# Patient Record
Sex: MALE | Race: WHITE | Employment: FULL TIME | URBAN - METROPOLITAN AREA
[De-identification: names, ages, dates, MRNs, and addresses within clinical notes are randomized per-mention and may not be internally consistent; named-entity substitution may affect disease eponyms.]

---

## 2019-05-15 ENCOUNTER — HOSPITAL ENCOUNTER (OUTPATIENT)
Dept: PHYSICAL THERAPY | Age: 54
Discharge: HOME | End: 2019-05-15
Payer: COMMERCIAL

## 2019-05-15 PROCEDURE — 97162 PT EVAL MOD COMPLEX 30 MIN: CPT

## 2019-05-15 PROCEDURE — 97140 MANUAL THERAPY 1/> REGIONS: CPT

## 2019-05-15 NOTE — PROGRESS NOTES
[address] PHYS[facility] THERAPY AT [facility] [address]  Phone [phone]  Fax [phone] PLAN OF CARE / STATEMENT OF MEDICAL NECESSITY FOR PHYSICAL THERAPY SERVICES Patient Name: Brandon Karimi : 1965 Medical  
Diagnosis: Left foot pain [M79.672] Treatment Diagnosis: Left foot pain Onset Date: Chronic Referral Source: Leighann Robison MD [place] [facility]): 5/15/2019 Prior Hospitalization: See medical history Provider #: [id number] Prior Level of Function: Functionally I Comorbidities: Hx of Left ankle scope, Right knee scope, prior episode of plantar fascitis Medications: Verified on Patient Summary List  
The Plan of Care and following information is based on the information from the initial evaluation.  
================================================================================= Assessment / key information:  47year old male presents for PT evaluation with diagnosis of Left foot pain. Patient referred by podiatry. Patient has undergone the following treatments for Left foot pain prior to presenting for PT evaluation: a prior episode of PT > 5 years ago which resulted in decreased pain and increased function, DPM issued B foot inserts with arch support and heel cup, plantar fascitis boot that he wears for an hour a day 4 or more days a week, 2 cortisone injections into the Left heel/PF (2018 and 2019) with the first decreasing pain for a month and the second not changing symptoms. Patient points to the medial left heel and the plantar fascia as the location of the pain. Patient reports that the pain can range from a 2-8/10. He reports increased heel pain on days that he works out at [employer]. He states that there is no pain while working out, but after he can experience severe pain when weight bearing after sitting for a prolonged time.  Patient has been treating the symptoms with ice and ibuprofen for pain management. 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 FOTO= 51/100. Functional limitations include decreased ability to ambulate or stand for prolonged time, and decreased ability to perform regular work out regimen. Clinical exam reveals decreased ROM of the Left foot when compared to the Right:   Left Foot Dorsiflexion : 0, Plantarflexion 30 *, Eversion 10 *, Inversion 40 *; Right Foot Dorsiflexion : 8*, Plantarflexion 30* , Eversion 15 *, Inversion 40*. Left Foot strength: Plantar flexion 4/5 with ability to perform 11 heel raises in standing. After 11 HR patient fatigues with decreased AROM. Right PF strength 5/5. All other planes of motion of B ankle 5/5. Patient ambulates with slight antalgic gait on the Left. In weight bearing, patient presents with a neutral stance. Palpation reveals extreme TTP along Left medial heel and mid plantar fascia. Trigger points are present in Left Soleus and Gastroc along with plantar fascia and of note is increased thickness of Left Achilles tendon. . Patient responded well to KinesioTape applied to the Left calf, achilles tendon and plantar fascia to decrease tension through PF. Patient should benefit from an episode of skilled PT to address above functional limitations and clinical deficits to improve quality of life and return to PLOF. Patient education performed with Gastroc/soleus stretch issued for HEP and use of frozen water bottle to perform self massage along the Left foot.  
================================================================================= Eval Complexity: History: HIGH Complexity :3+ comorbidities / personal factors will impact the outcome/ POC Exam:HIGH Complexity : 4+ Standardized tests and measures addressing body structure, function, activity limitation and / or participation in recreation  Presentation: MEDIUM Complexity : Evolving with changing characteristics  Clinical Decision Making:MEDIUM Complexity : FOTO score of 26-74Overall Complexity:MEDIUM Problem List: pain affecting function, decrease ROM, decrease strength, impaired gait/ balance, decrease ADL/ functional abilitiies, decrease activity tolerance and decrease flexibility/ joint mobility Treatment Plan may include any combination of the following: Therapeutic exercise, Therapeutic activities, Neuromuscular re-education, Physical agent/modality, Gait/balance training, Manual therapy, Patient education, Self Care training, Functional mobility training and Home safety training Patient / Family readiness to learn indicated by: asking questions and interest 
Persons(s) to be included in education: patient (P) Barriers to Learning/Limitations: None Measures taken:   
Patient Goal (s): \"To break up my tendonitis\" Patient self reported health status: excellent Rehabilitation Potential: good ? Short Term Goals: To be accomplished in  3  treatments: 1. Patient will demonstrate I and compliance with HEP to indicate active role in the rehab process. 2. Patient will report a 50% decrease in symptoms to allow for improved quality of life. ? Long Term Goals: To be accomplished in  8-12  treatments: 1. Patient will increase Left foot dorsiflexion to 10* to allow for improved gait mechanics. 2. Patient will demonstrate 20 Heel Raises with full ROM without reports of increased pain. 3. Patient will report ability to ambulate 500 ft without increased pain to allow for return to prior level of function. 4. Patient will report 0/10 pain in the Left foot/heel during and after activity to allow for return to PLOF. 5. Patient will increase FOTO score from 51 to 67/100 to indicate improved I with functional mobility. Frequency / Duration:   Patient to be seen  2-3  times per week for 8-12  treatments: 
Patient / Caregiver education and instruction: self care and brace/ splint application G-Codes (GP): NA 
 Therapist Signature: Bruna Mo PT Date: 5/15/2019 Certification Period: NA Time: 7:21 AM  
=========================================================================================== I certify that the above Physical Therapy Services are being furnished while the patient is under my care. I agree with the treatment plan and certify that this therapy is necessary. Physician Signature:       Date:      Time:  Please sign and return to [facility] or you may fax the signed copy to [phone]. Thank you.

## 2019-05-19 NOTE — PROGRESS NOTES
Request for use of Dry Needling/Intramuscular Manual Therapy Patient: Amanda Fuller     Referral Source: Renee Lewis MD 
Diagnosis: Left foot pain [M79.672]      : 1965 Date of initial visit: 5/15/2019   Attended visits: 1  Missed Visits: 0 Based on findings from the physical therapy examination and evaluation, the evaluating therapist believes the patient, Amanda Fuller  would benefit from including Dry Needling as part of the plan of care. Dry needling is a treatment technique utilized in conjunction with other PT interventions to inactivate myofascial trigger points and the pain and dysfunction they cause. Dry Needling is an advanced procedure that requires additional training including greater than 54 hours of intensive course work. Physical Therapists at [address] [place] [address] are certified through [address] [place] [address] courses for their education and are certified to perform treatments. PROCEDURE: 
? Solid filament sterile needle (typically 0.3mm/30 gauge) inserted into a trigger point ? Repeated movements inactivate the trigger points, taking 30-60 seconds per site ? Typically consists of 1 dry needling session per week and a possible second treatment including muscle re-education, flexibility, strengthening and other manual techniques to facilitate the benefits of dry needling BENEFITS: 
? Inactivation of trigger points ? Decreased pain ? Increased muscle length ? Improved movement patterns ? Restoration of function POTENTIAL RISKS: 
? Post-needling soreness ? Infection ? Bruising/bleeding ? Penetration of a nerve ? Pneumothorax All treating PTs have been thoroughly educated in avoiding adverse reactions If you agree with this recommendation, please sign this form and fax it to us at [phone]. If you have questions or concerns regarding dry needling or any other treatment we may be providing, please contact us at [phone]. Thank you for allowing us to assist in the care of your patient. Sukhi Rosa, PT    5/19/2019 2:55 PM  
NOTE TO PHYSICIAN:  PLEASE COMPLETE THE ORDERS BELOW AND  
FAX TO [facility]: [phone] If you are unable to process this request in 24 hours please contact our office:  
[phone] ? I have read the above request and AGREE to the recommendation of including dry needling as part of the plan of care. ? I have read the above request and DO NOT AGREE to including dry needling as part of the plan of care. ? I have read the above report and request that my patient continue therapy with the following changes/special instructions: 
________________________________________________________________________ Physicians signature: _______________________________________________ Date: ______________Time:_______________

## 2019-05-19 NOTE — PROGRESS NOTES
PT DAILY TREATMENT NOTE/FOOT AND ANKLE EVAL 10-18 Patient Name: Tawny Meckel Date:5/15/2019 : 1965 [x]  Patient  Verified Payor: BLUE CROSS / Plan: [facility] PPO / Product Type: PPO / In time:140  Out time:225 Total Treatment Time (min): 45 Visit #: 1 of  Medicare/[facility] Only Total Timed Codes (min):  10 1:1 Treatment Time:  45  
 
Treatment Area: Left foot pain [M79.672] SUBJECTIVE Pain Level (0-10 scale): 3/10 []constant [x]intermittent []improving []worsening []no change since onset Any medication changes, allergies to medications, adverse drug reactions, diagnosis change, or new procedure performed?: [x] No    [] Yes (see summary sheet for update) Subjective functional status/changes:  
47year old male presents for PT evaluation with diagnosis of Left foot pain. Patient referred by podiatry. Patient has undergone the following treatments for Left foot pain prior to presenting for PT evaluation: a prior episode of PT > 5 years ago which resulted in decreased pain and increased function, DPM issued B foot inserts with arch support and heel cup, plantar fascitis boot that he wears for an hour a day 4 or more days a week, 2 cortisone injections into the Left heel/PF (2018 and 2019) with the first decreasing pain for a month and the second not changing symptoms. Patient points to the medial left heel and the plantar fascia as the location of the pain. Patient reports that the pain can range from a 2-8/10. He reports increased heel pain on days that he works out at [employer]. He states that there is no pain while working out, but after he can experience severe pain when weight bearing after sitting for a prolonged time. Patient has been treating the symptoms with ice and ibuprofen for pain management. 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 FOTO= 51/100.   
 
OBJECTIVE/EXAMINATION 
 
35 min [x]Eval                  []Re-Eval  
 
 10 min Manual Therapy:  Ktape to Left gastroc, AT and PF to decrease force placed through PF insertion Rationale: decrease pain, increase ROM, increase tissue extensibility and decrease trigger points to tolerate prolonged walking and standing With 
 [] TE 
 [] TA 
 [] neuro 
 [] other: Patient Education: [x] Review HEP [] Progressed/Changed HEP based on:  
[] positioning   [] body mechanics   [] transfers   [] heat/ice application   
[] other:   
 
 
 
Physical Therapy Evaluation  - Foot and Ankle Functional limitations include decreased ability to ambulate or stand for prolonged time, and decreased ability to perform regular work out regimen. Clinical exam: 
ROM: Left Foot Dorsiflexion : 0, Plantarflexion 30 *, Eversion 10 *, Inversion 40 *; Right Foot Dorsiflexion : 8*, Plantarflexion 30* , Eversion 15 *, Inversion 40*. Strength: Left Foot strength: Plantar flexion 4/5 with ability to perform 11 heel raises in standing. After 11 HR patient fatigues with decreased AROM. Right PF strength 5/5. All other planes of motion of B ankle 5/5. Functional mobility: Patient ambulates with slight antalgic gait on the Left. In weight bearing, patient presents with a neutral stance. Palpation reveals extreme TTP along Left medial heel and mid plantar fascia. Trigger points are present in Left Soleus and Gastroc along with plantar fascia and of note is increased thickness of Left Achilles tendon. Pain Level (0-10 scale) post treatment: 5/10 ASSESSMENT/Changes in Function:  Patient should benefit from an episode of skilled PT to address above functional limitations and clinical deficits to improve quality of life and return to PLOF. Patient education performed with Gastroc/soleus stretch issued for HEP and use of frozen water bottle to perform self massage along the Left foot.   Patient responded well to KinesioTape applied to the Left calf, achilles tendon and plantar fascia to decrease tension through PF. [x]  See Plan of Care 
[]  See progress note/recertification 
[]  See Discharge Summary Progress towards goals / Updated goals: · Short Term Goals: To be accomplished in  3  treatments: 1. Patient will demonstrate I and compliance with HEP to indicate active role in the rehab process. 2. Patient will report a 50% decrease in symptoms to allow for improved quality of life. · Long Term Goals: To be accomplished in  8-12  treatments: 1. Patient will increase Left foot dorsiflexion to 10* to allow for improved gait mechanics. 2. Patient will demonstrate 20 Heel Raises with full ROM without reports of increased pain. 3. Patient will report ability to ambulate 500 ft without increased pain to allow for return to prior level of function. 4. Patient will report 0/10 pain in the Left foot/heel during and after activity to allow for return to PLOF.  
5. Patient will increase FOTO score from 51 to 67/100 to indicate improved I with functional mobility.  
  
PLAN 
[]  Upgrade activities as tolerated     []  Continue plan of care 
[]  Update interventions per flow sheet      
[]  Discharge due to:_ 
[x]  Other:_   Patient to be seen  2-3  times per week for 8-12  treatments: 
 
 
Rachel Larson, PT 5/15/2019  2:03 PM

## 2019-05-21 ENCOUNTER — HOSPITAL ENCOUNTER (OUTPATIENT)
Dept: PHYSICAL THERAPY | Age: 54
Discharge: HOME | End: 2019-05-21
Payer: COMMERCIAL

## 2019-05-21 PROCEDURE — 97016 VASOPNEUMATIC DEVICE THERAPY: CPT | Performed by: PHYSICAL THERAPIST

## 2019-05-21 PROCEDURE — 97140 MANUAL THERAPY 1/> REGIONS: CPT | Performed by: PHYSICAL THERAPIST

## 2019-05-21 PROCEDURE — 97035 APP MDLTY 1+ULTRASOUND EA 15: CPT | Performed by: PHYSICAL THERAPIST

## 2019-05-21 NOTE — PROGRESS NOTES
PT DAILY TREATMENT NOTE - [facility]     Patient Name: Coco Mcwilliams  Date:2019  : 1965  [x]  Patient  Verified  Payor: Pao Robles / Plan: Adrianne Crouch [id number] PPO / Product Type: PPO /    In time:2:15   Out time:3:25  Total Treatment Time (min): 70  Total Timed Codes (min): 55  1:1 Treatment Time ( only):  24  Visit #: 2 of     Treatment Area: Left foot pain [M79.882]    SUBJECTIVE  Pain Level IN:(0-10 scale): 4/10  Pain Level OUT: (0-10 scale) post treatment: 5/10    Any medication changes, allergies to medications, adverse drug reactions, diagnosis change, or new procedure performed?: [x] No    [] Yes (see summary sheet for update)  Subjective functional status/changes:   [] No changes reported  Patient states he has soreness on inside of left heel, worse when getting up from a seated position.  Patient stated he did not feel any difference from the [address] [place] last visit    OBJECTIVE    Modalities Rationale:     decrease inflammation, decrease pain and increase tissue extensibility to improve patient's ability to stand or ambulate for prolonged periods of time   min [] Estim, type/location:                                      []  att     []  unatt     []  w/US     []  w/ice    []  w/heat    min []  Mechanical Traction: type/lbs                   []  pro   []  sup   []  int   []  cont    []  before manual    []  after manual   8 min [x]  Ultrasound, settings/location:  1.0 w/cm2 to left heel/ plantar fascia    min []  Iontophoresis w/ dexamethasone, location:                                               []  take home patch       []  in clinic    min []  Ice     []  Heat    location/position:    15 min [x]  Vasopneumatic Device, press/temp: Low temp, low compression to left ankle    min []  Other:    [] Skin assessment post-treatment (if applicable):    []  intact    []  redness- no adverse reaction     []redness  adverse reaction:        32 min Therapeutic Exercise:  [x] See flow sheet : first follow up visit since initial evaluation - initiated POC per flow sheet    Rationale: increase ROM and increase strength to improve the patients ability to ambulate long distances without increase in pain      15 min Manual Therapy: DTM to left gastroc/ soleus, STM to left plantar fascia   Rationale: decrease pain, increase ROM, increase tissue extensibility and decrease trigger points to tolerate prolonged walking and standing            With   [] TE   [] TA   [] neuro   [] other: Patient Education: [x] Review HEP    [] Progressed/Changed HEP based on:   [] positioning   [] body mechanics   [] transfers   [] heat/ice application    [] other:      Objective/Functional Measures with Therapist Assessment Noted with Response to Therapy Session: First follow up visit since initial evaluation - initiated POC per flow sheet   Increased tension in left gastroc, soleus, and plantar fascia, as well as trigger points in lateral gastroc/soleus    ASSESSMENT/Changes in Function:   Patient with good tolerance to therex, completed without increase in pain and required minimal cueing to correct form. Patient presents with increased tension in left gastroc, soleus, and plantar fascia, as well as trigger points in lateral gastroc/soleus. Patient will continue to benefit from skilled PT services to modify and progress therapeutic interventions, address functional mobility deficits, address ROM deficits, address strength deficits and analyze and address soft tissue restrictions to attain remaining goals. [x]  See Plan of Care  []  See progress note/recertification  []  See Discharge Summary         Progress towards goals / Updated goals: · Short Term Goals: To be accomplished in  3  treatments:  1. Patient will demonstrate I and compliance with HEP to indicate active role in the rehab process. 2. Patient will report a 50% decrease in symptoms to allow for improved quality of life.   · Long Term Goals:  To be accomplished in  8-12  treatments:  1. Patient will increase Left foot dorsiflexion to 10* to allow for improved gait mechanics. 2. Patient will demonstrate 20 Heel Raises with full ROM without reports of increased pain. 3. Patient will report ability to ambulate 500 ft without increased pain to allow for return to prior level of function. 4. Patient will report 0/10 pain in the Left foot/heel during and after activity to allow for return to PLOF.   5. Patient will increase FOTO score from 51 to 67/100 to indicate improved I with functional mobility.     PLAN  [x]  Upgrade activities as tolerated     [x]  Continue plan of care  []  Update interventions per flow sheet       []  Discharge due to:_  [x]  Other:_  Check goals next visit    Jhony Jess 5/21/2019  1:47 PM    Future Appointments   Date Time Provider Sarina Carlin   5/21/2019  2:15 PM Watson Garcia DPT [facility]   5/23/2019  2:45 PM Irma Hernandez PTA [facility]   5/29/2019 12:15 PM [facility] PT [facility]   5/30/2019  8:45 AM Shruthi Mars, [address]   5/31/2019  1:30 PM Watson Garcia DPT [facility]

## 2019-05-23 ENCOUNTER — HOSPITAL ENCOUNTER (OUTPATIENT)
Dept: PHYSICAL THERAPY | Age: 54
Discharge: HOME | End: 2019-05-23
Payer: COMMERCIAL

## 2019-05-23 PROCEDURE — 97140 MANUAL THERAPY 1/> REGIONS: CPT

## 2019-05-23 PROCEDURE — 97110 THERAPEUTIC EXERCISES: CPT

## 2019-05-23 PROCEDURE — 97016 VASOPNEUMATIC DEVICE THERAPY: CPT

## 2019-05-23 NOTE — PROGRESS NOTES
PT DAILY TREATMENT NOTE - [facility]     Patient Name: Ranjeet Ellsworth  Date:2019  : 1965  [x]  Patient  Verified  Payor: BLUE CROSS / Plan: Adrianne Crouch [id number] PPO / Product Type: PPO /    In time:2:45  Out time:3:50  Total Treatment Time (min): 65  Total Timed Codes (min): 50  1:1 Treatment Time ( only):  20  Visit #: 3 of     Treatment Area: Left foot pain [M79.162]    SUBJECTIVE  Pain Level IN:(0-10 scale): 5/10  Pain Level OUT: (0-10 scale) post treatment: 5/10    Any medication changes, allergies to medications, adverse drug reactions, diagnosis change, or new procedure performed?: [x] No    [] Yes (see summary sheet for update)  Subjective functional status/changes:   [] No changes reported  Patient states he was very sore following last session     OBJECTIVE    Modalities Rationale:     decrease inflammation, decrease pain and increase tissue extensibility to improve patient's ability to stand or ambulate for prolonged periods of time   min [] Estim, type/location:                                      []  att     []  unatt     []  w/US     []  w/ice    []  w/heat    min []  Mechanical Traction: type/lbs                   []  pro   []  sup   []  int   []  cont    []  before manual    []  after manual    min []  Ultrasound, settings/location:      min []  Iontophoresis w/ dexamethasone, location:                                               []  take home patch       []  in clinic    min []  Ice     []  Heat    location/position:    15 min [x]  Vasopneumatic Device, press/temp: Med compression and low temp    min []  Other:    [] Skin assessment post-treatment (if applicable):    []  intact    []  redness- no adverse reaction     []redness  adverse reaction:        30 min Therapeutic Exercise:  [x] See flow sheet :    Rationale: increase ROM and increase strength to improve the patients ability to ambulate long distances without increase in pain      20 min Manual Therapy:  Performed cuboid whip, mobs to the 1st ray and great toe for flexion and extension . Mobs to talocrural joint to increase DF and calcaneous distraction  Deep tissue to the medial border of the plantar surface of the foot with use of Graston tool #3  Taping to lock the heel into neutral and dorsiflexion    Rationale: decrease pain, increase ROM, increase tissue extensibility and decrease trigger points to tolerate prolonged walking and standing            With   [] TE   [] TA   [] neuro   [] other: Patient Education: [x] Review HEP    [] Progressed/Changed HEP based on:   [] positioning   [] body mechanics   [] transfers   [] heat/ice application    [] other:      Objective/Functional Measures with Therapist Assessment Noted with Response to Therapy Session:   Patient presents with limited movement in the great toe into flexion and extension - patient reported that he has severely jammed his great toe 2 times in the last 4 years and the last time was about 1 1/2 year ago. That may contribuate  Decrease DF and trigger points to the entire border of the 1st ray and medial aspect of the left foot. Taped the ankle/calcaneous into neutral position -         ASSESSMENT/Changes in Function:   Patient will continue to benefit from skilled PT services to modify and progress therapeutic interventions, address functional mobility deficits, address ROM deficits, address strength deficits, analyze and address soft tissue restrictions and analyze and modify body mechanics/ergonomics to attain remaining goals. [x]  See Plan of Care  []  See progress note/recertification  []  See Discharge Summary         Progress towards goals / Updated goals: · Short Term Goals: To be accomplished in  3  treatments:  1. Patient will demonstrate I and compliance with HEP to indicate active role in the rehab process. 2. Patient will report a 50% decrease in symptoms to allow for improved quality of life.   · Long Term Goals:  To be accomplished in  8-12  treatments:  1. Patient will increase Left foot dorsiflexion to 10* to allow for improved gait mechanics. 2. Patient will demonstrate 20 Heel Raises with full ROM without reports of increased pain. 3. Patient will report ability to ambulate 500 ft without increased pain to allow for return to prior level of function. 4. Patient will report 0/10 pain in the Left foot/heel during and after activity to allow for return to PLOF.   5. Patient will increase FOTO score from 51 to 67/100 to indicate improved I with functional mobility.       PLAN  [x]  Upgrade activities as tolerated     [x]  Continue plan of care  []  Update interventions per flow sheet       []  Discharge due to:_  []  Other:_      Boo De Los Santos 5/23/2019  5:14 PM    Future Appointments   Date Time Provider Sarina Carlin   5/29/2019 12:15 PM [address] [place] [address] 2 [facility]   5/30/2019  8:45 AM Issac Carney, [address] [address]   5/31/2019  1:30 PM Yanira Gaona, DPT [facility]

## 2019-05-29 ENCOUNTER — HOSPITAL ENCOUNTER (OUTPATIENT)
Dept: PHYSICAL THERAPY | Age: 54
Discharge: HOME | End: 2019-05-29
Payer: COMMERCIAL

## 2019-05-29 PROCEDURE — 97035 APP MDLTY 1+ULTRASOUND EA 15: CPT

## 2019-05-29 PROCEDURE — 97110 THERAPEUTIC EXERCISES: CPT

## 2019-05-29 PROCEDURE — 97140 MANUAL THERAPY 1/> REGIONS: CPT

## 2019-05-29 NOTE — PROGRESS NOTES
PT DAILY TREATMENT NOTE - [facility]     Patient Name: Simi Maradiaga  Date:2019  : 1965  [x]  Patient  Verified  Payor: Milena Ryan / Plan: Adrianne Crouch [id number] PPO / Product Type: PPO /    In time: 4520  Out time: 1311  Total Treatment Time (min): 54  Total Timed Codes (min): 54  1:1 Treatment Time ( only):    Visit #: 4 of     Treatment Area: Left foot pain [M79.032]    SUBJECTIVE  Pain Level IN:(0-10 scale): 4/10  Pain Level OUT: (0-10 scale) post treatment: 5/10    Any medication changes, allergies to medications, adverse drug reactions, diagnosis change, or new procedure performed?: [x] No    [] Yes (see summary sheet for update)  Subjective functional status/changes:   [] No changes reported      OBJECTIVE    Modalities Rationale:     decrease inflammation, decrease pain and increase tissue extensibility to improve patient's ability to stand or ambulate for prolonged periods of time   min [] Estim, type/location:                                      []  att     []  unatt     []  w/US     []  w/ice    []  w/heat    min []  Mechanical Traction: type/lbs                   []  pro   []  sup   []  int   []  cont    []  before manual    []  after manual   8 min + 2 min set up min [x]  Ultrasound, settings/location:   Medial plantar surface of the L heel and plantarfascia     min []  Iontophoresis w/ dexamethasone, location:                                               []  take home patch       []  in clinic    min []  Ice     []  Heat    location/position:     min [x]  Vasopneumatic Device, press/temp: Med compression and low temp    min []  Other:    [] Skin assessment post-treatment (if applicable):    []  intact    []  redness- no adverse reaction     []redness  adverse reaction:        29/15 min Therapeutic Exercise:  [x] See flow sheet :    Rationale: increase ROM and increase strength to improve the patients ability to ambulate long distances without increase in pain      15 min Manual Therapy:   Deep tissue to the medial border of the plantar surface of the foot and gastroc/soleus   Rationale: decrease pain, increase ROM, increase tissue extensibility and decrease trigger points to tolerate prolonged walking and standing            With   [x] TE   [] TA   [] neuro   [] other: Patient Education: [x] Review HEP -  Self MFR and TrP using tennis ball for gastroc/soleus; MFR using tennis ball for plantarfascia   [] Progressed/Changed HEP based on:   [] positioning   [] body mechanics   [] transfers   [] heat/ice application    [] other:      Objective/Functional Measures with Therapist Assessment Noted with Response to Therapy Session:  DF in tall kneel: 30* R, 20* L with pain along FHL just posterior to medial malleolus and bony block in anterior ankle on the left  TrP and increased tension noted in L calf   TTP over medial plantar fascia        ASSESSMENT/Changes in Function: Pt presents with impaired DF secondary to bony block on L ankle as well as pain along the FHL just posterior to the medial malleolus. Pt also presents with nodule/adhesion in medial aspect of the plantar fascia. Continue to address gastroc/soleus as well as plantar surface of the foot. Assess FHL and FDL next visit to determine contribution to overall foot pain. Patient will continue to benefit from skilled PT services to modify and progress therapeutic interventions, address functional mobility deficits, address ROM deficits, address strength deficits, analyze and address soft tissue restrictions and analyze and modify body mechanics/ergonomics to attain remaining goals. [x]  See Plan of Care  []  See progress note/recertification  []  See Discharge Summary         Progress towards goals / Updated goals: · Short Term Goals: To be accomplished in  3  treatments:  1. Patient will demonstrate I and compliance with HEP to indicate active role in the rehab process.    2. Patient will report a 50% decrease in symptoms to allow for improved quality of life.   · Long Term Goals: To be accomplished in  8-12  treatments:  1. Patient will increase Left foot dorsiflexion to 10* to allow for improved gait mechanics. 2. Patient will demonstrate 20 Heel Raises with full ROM without reports of increased pain. 3. Patient will report ability to ambulate 500 ft without increased pain to allow for return to prior level of function. 4. Patient will report 0/10 pain in the Left foot/heel during and after activity to allow for return to PLOF.   5. Patient will increase FOTO score from 51 to 67/100 to indicate improved I with functional mobility.       PLAN  [x]  Upgrade activities as tolerated     [x]  Continue plan of care  []  Update interventions per flow sheet       []  Discharge due to:_  [x]  Other:_  NV assess FHL and FDL for contribution to foot pain    Xander Shay PT, DPT   5/29/2019   1406 PM    Future Appointments   Date Time Provider Sarina Carlin   5/30/2019  8:45 AM Fely Ibanez, [address]   5/31/2019  1:30 PM Merle Beaulieu DPT [facility]

## 2019-05-31 ENCOUNTER — HOSPITAL ENCOUNTER (OUTPATIENT)
Dept: PHYSICAL THERAPY | Age: 54
Discharge: HOME | End: 2019-05-31
Payer: COMMERCIAL

## 2019-05-31 PROCEDURE — 97140 MANUAL THERAPY 1/> REGIONS: CPT | Performed by: PHYSICAL THERAPIST

## 2019-05-31 PROCEDURE — 97110 THERAPEUTIC EXERCISES: CPT | Performed by: PHYSICAL THERAPIST

## 2019-05-31 PROCEDURE — 97014 ELECTRIC STIMULATION THERAPY: CPT | Performed by: PHYSICAL THERAPIST

## 2019-05-31 NOTE — PROGRESS NOTES
PT DAILY TREATMENT NOTE - [facility]     Patient Name: Brandon Karimi  Date:2019  : 1965  [x]  Patient  Verified  Payor: BLUE CROSS / Plan: Adrianne Crouch [id number] PPO / Product Type: PPO /    In time:130  Out time:220  Total Treatment Time (min): 50  Total Timed Codes (min): 35  1:1 Treatment Time ( only): 35   Visit #: 5 of     Treatment Area: Left foot pain [M79.186]    SUBJECTIVE  Pain Level (0-10 scale): 5/10  Any medication changes, allergies to medications, adverse drug reactions, diagnosis change, or new procedure performed?: [x] No    [] Yes (see summary sheet for update)  Subjective functional status/changes:   [] No changes reported  No new changes    OBJECTIVE    Modality rationale: decrease pain and increase tissue extensibility to improve the patients ability to improve functional mobility   Min Type Additional Details   15 [x] Estim: []Att   []Unatt        []TENS instruct                  []IFC  [x]Premod   []NMES                     []Other:  []w/US   []w/ice   [x]w/heat  Position:prone  Location: L gastroc/soleus    []  Traction: [] Cervical       []Lumbar                       [] Prone          []Supine                       []Intermittent   []Continuous Lbs:  [] before manual  [] after manual    []  Ultrasound: []Continuous   [] Pulsed                           []1MHz   []3MHz Location:  W/cm2:    []  Iontophoresis with dexamethasone         Location: [] Take home patch   [] In clinic    []  Ice     []  heat  []  Ice massage Position:  Location:    []  Laser  []  Other: Position:  Location:    []  Vasopneumatic Device Pressure:       [] lo [] med [] hi   Temperature: [] lo [] med [] hi   [] Skin assessment post-treatment:  []intact []redness- no adverse reaction    []redness  adverse reaction:     25 min Therapeutic Exercise:  [] See flow sheet :   Rationale: increase ROM and increase strength to improve the patients ability to improve activity tolerance     10 min Manual Therapy: Technique:      [x] S/DTM []IASTM []PROM [] Passive Stretching   [] Graston:  [] GT 1  [] GT 2 [] GT 3 [] GT 4 [] GT 4 [] GT 5  [] GT 6  [] Sweep [] Fan [] [place]  [] Brush   []  Swivel []J- Stroke [] Scoop []IFraming     [x]Manual TPR  [x] TDN (see objective; actual needle insertion time not billed)  []Jt manipulation:Gr I [] II []  III [] IV[] V[]  Treatment/Area:  L medial gastroc/soleus   Rationale:      decrease pain, increase ROM, increase tissue extensibility and decrease trigger points to improve patient's ability to improve walking tolerance            With   [] TE   [] TA   [] neuro   [] other: Patient Education: [x] Review HEP    [] Progressed/Changed HEP based on:   [] positioning   [] body mechanics   [] transfers   [] heat/ice application    [] Graston Education: Explained the effects and benefits of Graston Technique therapy including potential for post treatment soreness and bruising. [] Other:      Dry Needling Procedure Note    Dry Needle Session Number:  1    Procedure: An intramuscular manual therapy (dry needling) and a neuro-muscular re-education treatment was done to deactivate myofascial trigger points, with a 15/30 gauge solid filament needle, under aseptic technique. Indication(s): [] Muscle spasms [] Myalgia/Myositis  [] Muscle cramps      [] Muscle imbalances [] TMD (TMJ) [] Myofascial pain & dysfunction     [] Other: __    Chart reviewed for the following:  Rosario LAM DPT, have reviewed the medical history, summary list and precautions/contraindications for [employer].     TIME OUT performed immediately prior to start of procedure:  205 PM (enter time the timeout was completed)  Rosario LAM DPT, have performed the following reviews on [employer] prior to the start of the session:      [x] Patient was identified by name and date of birth    [x] Agreement on all muscles being treated was verified   [x] Purpose of dry needling, side effects, possible complications, risks and benefits were explained to the patient   [x] Procedure site(s) verified  [x] Patient was positioned for comfort and draped for privacy  [x] Informed Consent was signed (initial visit) and verified verbally (subsequent visits)  [x] Patient was instructed on the need to report the use of blood thinners and/or immunosuppressant medications  [x] How to respond to possible adverse effects of treatment  [x] Self treatment of post needling soreness: ice, heat (moist heat, heat wraps) and stretching  [x] Opportunity was given to ask any questions, all questions were answered            Treatment:  The following muscles were treated today:    Right:    Left: Gastroc/Soleus     Patients response to todays treatment:   [x]  LTRs  [x]  Muscle Relaxation  [x]  Pain Relief  []  Decreased Tinnitus  []  Decreased HAs [x]  Post needling soreness []  Increased ROM   []  Other:      Other Objective/Functional Measures:   Noted TrP in the L medial Gastroc/Soleus    Pain Level (0-10 scale) post treatment: 5/10    ASSESSMENT/Changes in Function:   + response in the above needled muscles leading to soreness post session. Assess effects next treatment. Patient will continue to benefit from skilled PT services to modify and progress therapeutic interventions, address functional mobility deficits, address ROM deficits, address strength deficits, analyze and address soft tissue restrictions and address imbalance/dizziness to attain remaining goals. Progress towards goals / Updated goals: · Short Term Goals: To be accomplished in  3  treatments:  1. Patient will demonstrate I and compliance with HEP to indicate active role in the rehab process. 2. Patient will report a 50% decrease in symptoms to allow for improved quality of life.   · Long Term Goals: To be accomplished in  8-12  treatments:  1. Patient will increase Left foot dorsiflexion to 10* to allow for improved gait mechanics.    2. Patient will demonstrate 20 Heel Raises with full ROM without reports of increased pain. 3. Patient will report ability to ambulate 500 ft without increased pain to allow for return to prior level of function. 4. Patient will report 0/10 pain in the Left foot/heel during and after activity to allow for return to PLOF.   5. Patient will increase FOTO score from 51 to 67/100 to indicate improved I with functional mobility.        PLAN  []  Upgrade activities as tolerated     [x]  Continue plan of care  []  Update interventions per flow sheet       []  Discharge due to:_  [x]  Other: check goals, assess effects of IMT      Vijay Mello DPT 5/31/2019  3:11 PM

## 2019-06-03 ENCOUNTER — HOSPITAL ENCOUNTER (OUTPATIENT)
Dept: PHYSICAL THERAPY | Age: 54
Discharge: HOME | End: 2019-06-03
Payer: COMMERCIAL

## 2019-06-03 PROCEDURE — 97140 MANUAL THERAPY 1/> REGIONS: CPT

## 2019-06-03 PROCEDURE — 97035 APP MDLTY 1+ULTRASOUND EA 15: CPT

## 2019-06-03 NOTE — PROGRESS NOTES
PT DAILY TREATMENT NOTE - [facility]     Patient Name: Tawny Meckel  Date:6/3/2019  : 1965  [x]  Patient  Verified  Payor: BLUE MARIA L / Plan: Adrianne Crouch [id number] PPO / Product Type: PPO /    In time:3:22  Out time: 4:05  Total Treatment Time (min): 43  Total Timed Codes (min): 43  1:1 Treatment Time ( only): 28   Visit #:  6 of     Treatment Area: Left foot pain [M63.104]    SUBJECTIVE  Pain Level (0-10 scale): 10  Any medication changes, allergies to medications, adverse drug reactions, diagnosis change, or new procedure performed?: [x] No    [] Yes (see summary sheet for update)  Subjective functional status/changes:   [] No changes reported  It still hurts over the side of my heal - the dry needling hurt for a couple of days but I think now it feels better   OBJECTIVE    Modality rationale: decrease pain and increase tissue extensibility to improve the patients ability to improve functional mobility   Min Type Additional Details    [] Estim: []Att   []Unatt        []TENS instruct                  []IFC  [x]Premod   []NMES                     []Other:  []w/US   []w/ice   [x]w/heat  Position:prone  Location: L gastroc/soleus    []  Traction: [] Cervical       []Lumbar                       [] Prone          []Supine                       []Intermittent   []Continuous Lbs:  [] before manual  [] after manual   8 [x]  Ultrasound: []Continuous   [x] Pulsed                           [x]1MHz   []3MHz Location: to left medial heel   W/cm2: 1.3    []  Iontophoresis with dexamethasone         Location: [] Take home patch   [] In clinic    []  Ice     []  heat  []  Ice massage Position:  Location:    []  Laser  []  Other: Position:  Location:    []  Vasopneumatic Device Pressure:       [] lo [] med [] hi   Temperature: [] lo [] med [] hi   [] Skin assessment post-treatment:  []intact []redness- no adverse reaction    []redness  adverse reaction:     25 min Therapeutic Exercise:  [] See flow sheet : Rationale: increase ROM and increase strength to improve the patients ability to improve activity tolerance     20 min Manual Therapy: Technique:      [x] S/DTM []IASTM []PROM [] Passive Stretching   [] Graston:  [] GT 1  [] GT 2 [] GT 3 [] GT 4 [] GT 4 [] GT 5  [] GT 6  [] Sweep [] Fan [] [place]  [] Brush   []  Swivel []J- Stroke [] Scoop []IFraming     [x]Manual TPR  [x] TDN (see objective; actual needle insertion time not billed)  []Jt manipulation:Gr I [] II []  III [] IV[] V[]  Treatment/Area:  L medial gastroc/soleus  Joint mobs to the (L) ankle and great toe   DTM to the plantar fascia area  Taping to the heal to lock into neutral position   Rationale:      decrease pain, increase ROM, increase tissue extensibility and decrease trigger points to improve patient's ability to improve walking tolerance            With   [] TE   [] TA   [] neuro   [] other: Patient Education: [x] Review HEP    [] Progressed/Changed HEP based on:   [] positioning   [] body mechanics   [] transfers   [] heat/ice application    [] Graston Education: Explained the effects and benefits of Graston Technique therapy including potential for post treatment soreness and bruising. [] Other:      Objective/Functional Measures with Therapist Assessment Noted with Response to Therapy Session:     Patient is able to achieve 5 degrees of DF prior to manual secondary to decrease tightness to the gastro/soleus   1. Patient will increase Left foot dorsiflexion to 10* to allow for improved gait mechanics. Progressing towards 5 degrees 6/3/19     ASSESSMENT/Changes in Function:   Patient will continue to benefit from skilled PT services to modify and progress therapeutic interventions, address functional mobility deficits, address ROM deficits, address strength deficits, analyze and address soft tissue restrictions and analyze and modify body mechanics/ergonomics to attain remaining goals. Progress towards goals / Updated goals:   · Short Term Goals: To be accomplished in  3  treatments:  1. Patient will demonstrate I and compliance with HEP to indicate active role in the rehab process. 2. Patient will report a 50% decrease in symptoms to allow for improved quality of life.   · Long Term Goals: To be accomplished in  8-12  treatments:  1. Patient will increase Left foot dorsiflexion to 10* to allow for improved gait mechanics. Progressing towards 5 degrees 6/3/19  2. Patient will demonstrate 20 Heel Raises with full ROM without reports of increased pain. 3. Patient will report ability to ambulate 500 ft without increased pain to allow for return to prior level of function. 4. Patient will report 0/10 pain in the Left foot/heel during and after activity to allow for return to PLOF.   5. Patient will increase FOTO score from 51 to 67/100 to indicate improved I with functional mobility.        PLAN  [x]  Upgrade activities as tolerated     [x]  Continue plan of care  []  Update interventions per flow sheet       []  Discharge due to:_  []  Other:       Cheng Carter PTA 6/3/2019  3:11 PM

## 2019-06-07 ENCOUNTER — HOSPITAL ENCOUNTER (OUTPATIENT)
Dept: PHYSICAL THERAPY | Age: 54
Discharge: HOME | End: 2019-06-07
Payer: COMMERCIAL

## 2019-06-07 PROCEDURE — 97035 APP MDLTY 1+ULTRASOUND EA 15: CPT | Performed by: PHYSICAL THERAPIST

## 2019-06-07 PROCEDURE — 97140 MANUAL THERAPY 1/> REGIONS: CPT | Performed by: PHYSICAL THERAPIST

## 2019-06-07 NOTE — PROGRESS NOTES
PT DAILY TREATMENT NOTE - [facility]     Patient Name: Lane Oconnell  Date:2019  : 1965  [x]  Patient  Verified  Payor: Jena Alvarado / Plan: Adrianne Crouch [id number] PPO / Product Type: PPO /    In time:215  Out time 303  Total Treatment Time (min): 48  Total Timed Codes (min): 48  1:1 Treatment Time ( only): 23   Visit #: 6 of     Treatment Area: Left foot pain [M79.032]    SUBJECTIVE  Pain Level (0-10 scale): 5/10  Any medication changes, allergies to medications, adverse drug reactions, diagnosis change, or new procedure performed?: [x] No    [] Yes (see summary sheet for update)  Subjective functional status/changes:   [] No changes reported  Some days I have no pain and other days I am good.      OBJECTIVE    Modality rationale: decrease pain and increase tissue extensibility to improve the patients ability to improve functional mobility   Min Type Additional Details   H [x] Estim: []Att   []Unatt        []TENS instruct                  []IFC  [x]Premod   []NMES                     []Other:  []w/US   []w/ice   [x]w/heat  Position:prone  Location: L gastroc/soleus    []  Traction: [] Cervical       []Lumbar                       [] Prone          []Supine                       []Intermittent   []Continuous Lbs:  [] before manual  [] after manual   8 [x]  Ultrasound: [x]Continuous   [] Pulsed                           [x]1MHz   []3MHz Location: abductor hallicus on the L  T/RH4:6.0    []  Iontophoresis with dexamethasone         Location: [] Take home patch   [] In clinic    []  Ice     []  heat  []  Ice massage Position:  Location:    []  Laser  []  Other: Position:  Location:    []  Vasopneumatic Device Pressure:       [] lo [] med [] hi   Temperature: [] lo [] med [] hi   [] Skin assessment post-treatment:  []intact []redness- no adverse reaction    []redness  adverse reaction:     25 min Therapeutic Exercise:  [] See flow sheet :   Rationale: increase ROM and increase strength to improve the patients ability to improve activity tolerance     15 min Manual Therapy: Technique:      [x] S/DTM []IASTM []PROM [] Passive Stretching   [] Graston:  [] GT 1  [] GT 2 [] GT 3 [] GT 4 [] GT 4 [] GT 5  [] GT 6  [] Sweep [] Fan [] [place]  [] Brush   []  Swivel []J- Stroke [] Scoop []IFraming     [x]Manual TPR  [x] TDN (see objective; actual needle insertion time not billed)  []Jt manipulation:Gr I [] II []  III [] IV[] V[]  Treatment/Area:  L medial gastroc/soleus/Abductor hallicus   Rationale:      decrease pain, increase ROM, increase tissue extensibility and decrease trigger points to improve patient's ability to improve walking tolerance            With   [] TE   [] TA   [] neuro   [] other: Patient Education: [x] Review HEP    [] Progressed/Changed HEP based on:   [] positioning   [] body mechanics   [] transfers   [] heat/ice application    [] Graston Education: Explained the effects and benefits of Graston Technique therapy including potential for post treatment soreness and bruising. [] Other:      Dry Needling Procedure Note    Dry Needle Session Number:  2  Procedure: An intramuscular manual therapy (dry needling) and a neuro-muscular re-education treatment was done to deactivate myofascial trigger points, with a 15/30 gauge solid filament needle, under aseptic technique. Indication(s): [] Muscle spasms [] Myalgia/Myositis  [] Muscle cramps      [] Muscle imbalances [] TMD (TMJ) [] Myofascial pain & dysfunction     [] Other: __    Chart reviewed for the following:  INoel DPT, have reviewed the medical history, summary list and precautions/contraindications for [employer].     TIME OUT performed immediately prior to start of procedure:  255 PM (enter time the timeout was completed)  Noel LAM DPT, have performed the following reviews on [employer] prior to the start of the session:      [x] Patient was identified by name and date of birth    [x] Agreement on all muscles being treated was verified   [x] Purpose of dry needling, side effects, possible complications, risks and benefits were explained to the patient   [x] Procedure site(s) verified  [x] Patient was positioned for comfort and draped for privacy  [x] Informed Consent was signed (initial visit) and verified verbally (subsequent visits)  [x] Patient was instructed on the need to report the use of blood thinners and/or immunosuppressant medications  [x] How to respond to possible adverse effects of treatment  [x] Self treatment of post needling soreness: ice, heat (moist heat, heat wraps) and stretching  [x] Opportunity was given to ask any questions, all questions were answered            Treatment:  The following muscles were treated today:    Right:    Left: Gastroc/Soleus/Abductor hallicus     Patients response to todays treatment:   [x]  LTRs  [x]  Muscle Relaxation  [x]  Pain Relief  []  Decreased Tinnitus  []  Decreased HAs [x]  Post needling soreness []  Increased ROM   []  Other:      Other Objective/Functional Measures:   Noted TrP in the L medial Gastroc/Soleus    Pain Level (0-10 scale) post treatment: 2-3/10    ASSESSMENT/Changes in Function:   + response in the above needled muscles leading to soreness post session however, pt tolerated IMT with increased ease. Continue PT per current POC. Patient will continue to benefit from skilled PT services to modify and progress therapeutic interventions, address functional mobility deficits, address ROM deficits, address strength deficits, analyze and address soft tissue restrictions and address imbalance/dizziness to attain remaining goals. Progress towards goals / Updated goals: · Short Term Goals: To be accomplished in  3  treatments:  1. Patient will demonstrate I and compliance with HEP to indicate active role in the rehab process.    2. Patient will report a 50% decrease in symptoms to allow for improved quality of life.   · Long Term Goals: To be accomplished in  8-12  treatments:  1. Patient will increase Left foot dorsiflexion to 10* to allow for improved gait mechanics. 2. Patient will demonstrate 20 Heel Raises with full ROM without reports of increased pain. 3. Patient will report ability to ambulate 500 ft without increased pain to allow for return to prior level of function. 4. Patient will report 0/10 pain in the Left foot/heel during and after activity to allow for return to PLOF.   5. Patient will increase FOTO score from 51 to 67/100 to indicate improved I with functional mobility.        PLAN  []  Upgrade activities as tolerated     [x]  Continue plan of care  []  Update interventions per flow sheet       []  Discharge due to:_  [x]  Other: check goals, assess effects of IMT      Lavon Ramsey DPT 6/7/2019  3:13 PM

## 2019-06-10 ENCOUNTER — HOSPITAL ENCOUNTER (OUTPATIENT)
Dept: PHYSICAL THERAPY | Age: 54
Discharge: HOME | End: 2019-06-10
Payer: COMMERCIAL

## 2019-06-10 PROCEDURE — 97140 MANUAL THERAPY 1/> REGIONS: CPT

## 2019-06-10 NOTE — PROGRESS NOTES
PT DAILY TREATMENT NOTE - [facility]     Patient Name: Sandra Bravo  Date:6/10/2019  : 1965  [x]  Patient  Verified  Payor: BLUE CROSS / Plan: Adrianne Crouch [id number] PPO / Product Type: PPO /    In time:3:29  Out time: 4:14  Total Treatment Time (min): 45  Total Timed Codes (min): 45  1:1 Treatment Time ( only):  30  Visit #:   8 of     Treatment Area: Left foot pain [M79.435]    SUBJECTIVE  Pain Level (0-10 scale): 5/10  Post pain level: 3/10  Any medication changes, allergies to medications, adverse drug reactions, diagnosis change, or new procedure performed?: [x] No    [] Yes (see summary sheet for update)  Subjective functional status/changes:   [] No changes reported  My calf is not as tight as it was and my great toe is not as stiff as it was - but I still have that same pain to my heel - when I first get out of bed or when I get up from a chair      OBJECTIVE    Modality rationale: decrease pain and increase tissue extensibility to improve the patients ability to improve functional mobility   Min Type Additional Details    [] Estim: []Att   []Unatt        []TENS instruct                  []IFC  [x]Premod   []NMES                     []Other:  []w/US   []w/ice   [x]w/heat  Position:prone  Location: L gastroc/soleus    []  Traction: [] Cervical       []Lumbar                       [] Prone          []Supine                       []Intermittent   []Continuous Lbs:  [] before manual  [] after manual    [x]  Ultrasound: []Continuous   [x] Pulsed                           [x]1MHz   []3MHz Location: to left medial heel   W/cm2: 1.3    []  Iontophoresis with dexamethasone         Location: [] Take home patch   [] In clinic    []  Ice     []  heat  []  Ice massage Position:  Location:   8 [x]  Laser 9J/cm2   []  Other: Position: in prone   Location: (L) medial heel and plantar surface     []  Vasopneumatic Device Pressure:       [] lo [] med [] hi   Temperature: [] lo [] med [] hi   [] Skin assessment post-treatment:  []intact []redness- no adverse reaction    []redness  adverse reaction:     12/7 min Therapeutic Exercise:  [x] See flow sheet :5 min NC for warm up    Rationale: increase ROM and increase strength to improve the patients ability to improve activity tolerance     25 min Manual Therapy: Technique:      [x] S/DTM []IASTM []PROM [] Passive Stretching   [] Graston:  [] GT 1  [] GT 2 [] GT 3 [] GT 4 [] GT 4 [] GT 5  [] GT 6  [] Sweep [] Fan [] [place]  [] Brush   []  Swivel []J- Stroke [] Scoop []IFraming     [x]Manual TPR  [x] TDN (see objective; actual needle insertion time not billed)  []Jt manipulation:Gr I [] II []  III [] IV[] V[]  Treatment/Area:  L medial gastroc/soleus  Joint mobs to the (L) ankle and great toe   DTM to the plantar fascia area   attempted Taping to the calcaneous to    Rationale:      decrease pain, increase ROM, increase tissue extensibility and decrease trigger points to improve patient's ability to improve walking tolerance            With   [] TE   [] TA   [] neuro   [] other: Patient Education: [x] Review HEP    [] Progressed/Changed HEP based on:   [] positioning   [] body mechanics   [] transfers   [] heat/ice application    [] Graston Education: Explained the effects and benefits of Graston Technique therapy including potential for post treatment soreness and bruising. [] Other:      Objective/Functional Measures with Therapist Assessment Noted with Response to Therapy Session:  Attempted to tape the calcaneous into more of a supination vs pronation position secondary to supination of the calcaneous caused increased pain - however did not change status - still continued to have pain with initial weight bearing and with ambulation.   Attempted the use of laser to elicit an inflammatory response to area to assist with increasing healing effects to this area directly over the medial aspect of the heal. Will assess the effects of the treatment next visit    ASSESSMENT/Changes in Function:   Patient will continue to benefit from skilled PT services to modify and progress therapeutic interventions, address functional mobility deficits, address ROM deficits, address strength deficits, analyze and address soft tissue restrictions and analyze and modify body mechanics/ergonomics to attain remaining goals. Progress towards goals / Updated goals: · Short Term Goals: To be accomplished in  3  treatments:  1. Patient will demonstrate I and compliance with HEP to indicate active role in the rehab process. 2. Patient will report a 50% decrease in symptoms to allow for improved quality of life.   · Long Term Goals: To be accomplished in  8-12  treatments:  1. Patient will increase Left foot dorsiflexion to 10* to allow for improved gait mechanics. Progressing towards 5 degrees 6/3/19  2. Patient will demonstrate 20 Heel Raises with full ROM without reports of increased pain. 3. Patient will report ability to ambulate 500 ft without increased pain to allow for return to prior level of function. 4. Patient will report 0/10 pain in the Left foot/heel during and after activity to allow for return to PLOF.   5. Patient will increase FOTO score from 51 to 67/100 to indicate improved I with functional mobility.        PLAN  [x]  Upgrade activities as tolerated     [x]  Continue plan of care  []  Update interventions per flow sheet       []  Discharge due to:_  [x]  Other:  Assess the effects of the laser treatment next visit     Oralia Max, PTA 6/10/2019  3:11 PM

## 2019-06-14 ENCOUNTER — HOSPITAL ENCOUNTER (OUTPATIENT)
Dept: PHYSICAL THERAPY | Age: 54
Discharge: HOME | End: 2019-06-14
Payer: COMMERCIAL

## 2019-06-14 PROCEDURE — 97140 MANUAL THERAPY 1/> REGIONS: CPT | Performed by: PHYSICAL THERAPIST

## 2019-06-14 PROCEDURE — 97110 THERAPEUTIC EXERCISES: CPT | Performed by: PHYSICAL THERAPIST

## 2019-06-14 NOTE — PROGRESS NOTES
PT DAILY TREATMENT NOTE - [facility]     Patient Name: Florencio Zamora  Date:2019  : 1965  [x]  Patient  Verified  Payor: Lavonne Laws / Plan: Adrianne Crouch [id number] PPO / Product Type: PPO /    In time 138   Out time: 230  Total Treatment Time (min): 52  Total Timed Codes (min): 42  1:1 Treatment Time ( only):  42  Visit #:   9  of     Treatment Area: Left foot pain [M79.846]    SUBJECTIVE  Pain Level (0-10 scale): 10  Post pain level: 3/10  Any medication changes, allergies to medications, adverse drug reactions, diagnosis change, or new procedure performed?: [x] No    [] Yes (see summary sheet for update)  Subjective functional status/changes:   [] No changes reported  My arch feels better but my heel feels the same.      OBJECTIVE    Modality rationale: decrease pain and increase tissue extensibility to improve the patients ability to improve functional mobility   Min Type Additional Details    [] Estim: []Att   []Unatt        []TENS instruct                  []IFC  [x]Premod   []NMES                     []Other:  []w/US   []w/ice   [x]w/heat  Position:prone  Location: L gastroc/soleus    []  Traction: [] Cervical       []Lumbar                       [] Prone          []Supine                       []Intermittent   []Continuous Lbs:  [] before manual  [] after manual    [x]  Ultrasound: []Continuous   [x] Pulsed                           [x]1MHz   []3MHz Location: to left medial heel   W/cm2: 1.3    []  Iontophoresis with dexamethasone         Location: [] Take home patch   [] In clinic   10 []  Ice     [x]  heat  []  Ice massage Position: seated  Location: L foot   H [x]  Laser 9J/cm2   []  Other: Position: in prone   Location: (L) medial heel and plantar surface     []  Vasopneumatic Device Pressure:       [] lo [] med [] hi   Temperature: [] lo [] med [] hi   [] Skin assessment post-treatment:  []intact []redness- no adverse reaction    []redness  adverse reaction:     17 min Therapeutic Exercise:  [x] See flow sheet :5 min NC for warm up    Rationale: increase ROM and increase strength to improve the patients ability to improve activity tolerance     25 min Manual Therapy: Technique:      [x] S/DTM []IASTM []PROM [] Passive Stretching   [x] Graston:  [] GT 1  [] GT 2 [x] GT 3 [x] GT 4 [] GT 4 [] GT 5  [x] GT 6  [x] Sweep [x] Fan [x] [place]  [x] Brush   []  Swivel [x]J- Stroke [] Scoop [x]IFraming     [x]Manual TPR  [] TDN (see objective; actual needle insertion time not billed)  []Jt manipulation:Gr I [] II []  III [] IV[] V[]  Treatment/Area L plantar aspect of the foot   Rationale:      decrease pain, increase ROM, increase tissue extensibility and decrease trigger points to improve patient's ability to improve walking tolerance            With   [] TE   [] TA   [] neuro   [] other: Patient Education: [x] Review HEP    [] Progressed/Changed HEP based on:   [] positioning   [] body mechanics   [] transfers   [] heat/ice application    [] Graston Education: Explained the effects and benefits of Graston Technique therapy including potential for post treatment soreness and bruising. [] Other:      Objective/Functional Measures       ASSESSMENT/Changes in Function: Pt continues to have pain that is unchanged with therapy on the medial aspect of the foot. Noted improvements in PF flexibility and pain. Pt educated to make FU with MD for further evaluation. Assess effects of GRASTON next session and reassess for PN. Patient will continue to benefit from skilled PT services to modify and progress therapeutic interventions, address functional mobility deficits, address ROM deficits, address strength deficits, analyze and address soft tissue restrictions and analyze and modify body mechanics/ergonomics to attain remaining goals. Progress towards goals / Updated goals: · Short Term Goals: To be accomplished in  3  treatments:  1.  Patient will demonstrate I and compliance with HEP to indicate active role in the rehab process. 2. Patient will report a 50% decrease in symptoms to allow for improved quality of life.   · Long Term Goals: To be accomplished in  8-12  treatments:  1. Patient will increase Left foot dorsiflexion to 10* to allow for improved gait mechanics. Progressing towards 5 degrees 6/3/19  2. Patient will demonstrate 20 Heel Raises with full ROM without reports of increased pain. 3. Patient will report ability to ambulate 500 ft without increased pain to allow for return to prior level of function. 4. Patient will report 0/10 pain in the Left foot/heel during and after activity to allow for return to PLOF.   5. Patient will increase FOTO score from 51 to 67/100 to indicate improved I with functional mobility.        PLAN  [x]  Upgrade activities as tolerated     [x]  Continue plan of care  []  Update interventions per flow sheet       []  Discharge due to:_  [x]  Other:  Assess the effects of GRASTON next visit, PN Camie Jeans, DPT 6/14/2019  3:21 PM

## 2019-06-18 ENCOUNTER — HOSPITAL ENCOUNTER (OUTPATIENT)
Dept: PHYSICAL THERAPY | Age: 54
Discharge: HOME | End: 2019-06-18
Payer: COMMERCIAL

## 2019-06-18 PROCEDURE — 97110 THERAPEUTIC EXERCISES: CPT | Performed by: PHYSICAL THERAPIST

## 2019-06-18 NOTE — PROGRESS NOTES
PT DAILY TREATMENT NOTE - [facility]     Patient Name: Maddie Mitchell  Date:2019  : 1965  [x]  Patient  Verified  Payor: Milly Michael / Plan: Adrianne Crouch [id number] PPO / Product Type: PPO /    In time 1145   Out time: 1220  Total Treatment Time (min): 35  Total Timed Codes (min): 35  1:1 Treatment Time ( only):  35  Visit #: 10  of     Treatment Area: Left foot pain [M79.672]    SUBJECTIVE  Pain Level (0-10 scale): 4/10  Post pain level: 3/10  Any medication changes, allergies to medications, adverse drug reactions, diagnosis change, or new procedure performed?: [x] No    [] Yes (see summary sheet for update)  Subjective functional status/changes:   [] No changes reported  Pt reports 80% improvement in symptoms of the L arch however, his L heel pain is the same. He reports increased pain after rest and then initial standing in the L heel. He reports 1-2/10 in the L heel/arch with prolonged walking but initial standing after rest spikes his pain. OBJECTIVE    35 min Therapeutic Exercise:  [x] See flow sheet :PT reassessment   Rationale: increase ROM and increase strength to improve the patients ability to improve activity tolerance           With   [] TE   [] TA   [] neuro   [] other: Patient Education: [x] Review HEP    [] Progressed/Changed HEP based on:   [] positioning   [] body mechanics   [] transfers   [] heat/ice application    [] Graston Education: Explained the effects and benefits of Graston Technique therapy including potential for post treatment soreness and bruising.   [] Other:      Objective/Functional Measures  FOTO improved to 64/100  DF: 10 deg on the L  Pt was able to perform 20 SL HR on the L without pain  SI: in good alignment  B HS flexibility WNL and equal B  B hip strength: 5/5 in all planes       ASSESSMENT/Changes in Function:   Upon reassessment, pt presents with improvements in K ankle DF and strength however, continue to have L medial heel pain that is unchanged with PT. He has full hip strength/HS length with SI in good alignment. At this time due to lack of change in L heel pain it is recommended that pt FU with MD regarding lack of change in L heel pain. Pt to be placed on hold until MD recommendation. Progress towards goals / Updated goals: · Short Term Goals: To be accomplished in  3  treatments:  1. Patient will demonstrate I and compliance with HEP to indicate active role in the rehab process. Met 6/18/19  2. Patient will report a 50% decrease in symptoms to allow for improved quality of life. progressing 6/18/19  · Long Term Goals: To be accomplished in  8-12  treatments:  1. Patient will increase Left foot dorsiflexion to 10* to allow for improved gait mechanics. Met 6/18/19  2. Patient will demonstrate 20 Heel Raises with full ROM without reports of increased pain. Met 6/18/19   3. Patient will report ability to ambulate 500 ft without increased pain to allow for return to prior level of function. Met 6/18/19  4. Patient will report 0/10 pain in the Left foot/heel during and after activity to allow for return to PLOF.  progressing 6/18/19  5. Patient will increase FOTO score from 51 to 67/100 to indicate improved I with functional mobility.  progressing 6/18/19       PLAN  [x]  Upgrade activities as tolerated     [x]  Continue plan of care  []  Update interventions per flow sheet       []  Discharge due to:_  [x]  Other:  Pt on hold until FU with MD due to the lack of change in symptoms in the L heel with PT.  Will continue PT if recommended by MD 2x/week for 4 weeks     Evan Roth DPT 6/18/2019  3:21 PM

## 2019-06-20 ENCOUNTER — APPOINTMENT (OUTPATIENT)
Dept: PHYSICAL THERAPY | Age: 54
End: 2019-06-20
Payer: COMMERCIAL

## 2019-06-25 ENCOUNTER — APPOINTMENT (OUTPATIENT)
Dept: PHYSICAL THERAPY | Age: 54
End: 2019-06-25
Payer: COMMERCIAL

## 2019-06-27 ENCOUNTER — APPOINTMENT (OUTPATIENT)
Dept: PHYSICAL THERAPY | Age: 54
End: 2019-06-27
Payer: COMMERCIAL

## 2019-06-27 NOTE — PROGRESS NOTES
[address] [facility] AT [facility]  [address] [facility] [place] [address],[address], [place], [place]  Phone [phone]  Fax [phone]  PROGRESS NOTE  Patient Name: Katlyn Solis : 1965   Treatment/Medical Diagnosis: Left foot pain [N64.223]   Referral Source: Viviana Robles MD     Date of Initial Visit: 5/15/19 Attended Visits: 10 Missed Visits: 0     SUMMARY OF TREATMENT  Pt seen for L heel pain for 10 sessions. Therapy included functional strengthening/stretching, manual to improve tissue extensibility, and modalities for pain management. CURRENT STATUS  Pt reports 80% improvement in symptoms of the L arch however, his L heel pain is the same. He reports increased pain after rest and then initial standing in the L heel. He reports 1-2/10 in the L heel/arch with prolonged walking but initial standing after rest spikes his pain. Upon reassessment, pt presents with improvements in K ankle DF and strength however, continue to have L medial heel pain that is unchanged with PT. He has full hip strength/HS length with SI in good alignment. At this time due to lack of change in L heel pain it is recommended that pt FU with MD regarding lack of change in L heel pain. Pt to be placed on hold until MD recommendation. Objective/Functional Measures  FOTO improved to 64/100  DF: 10 deg on the L  Pt was able to perform 20 SL HR on the L without pain  SI: in good alignment  B HS flexibility WNL and equal B  B hip strength: 5/5 in all planes       Goal/Measure of Progress Goal Met? · Short Term Goals: To be accomplished in  3  treatments:  1. Patient will demonstrate I and compliance with HEP to indicate active role in the rehab process. Met 19  2. Patient will report a 50% decrease in symptoms to allow for improved quality of life. progressing 19  · Long Term Goals: To be accomplished in  8-12  treatments:  1.  Patient will increase Left foot dorsiflexion to 10* to allow for improved gait mechanics. Met 6/18/19  2. Patient will demonstrate 20 Heel Raises with full ROM without reports of increased pain. Met 6/18/19   3. Patient will report ability to ambulate 500 ft without increased pain to allow for return to prior level of function. Met 6/18/19  4. Patient will report 0/10 pain in the Left foot/heel during and after activity to allow for return to PLOF.  progressing 6/18/19  5. Patient will increase FOTO score from 51 to 67/100 to indicate improved I with functional mobility.  progressing 6/18/19      New Goals to be achieved in __4__  weeks:  1. Patient will report 0/10 pain in the Left foot/heel during and after activity to allow for return to PLOF. 2.  Patient will increase FOTO score from 51 to 67/100 to indicate improved I with functional mobility. G-Codes:   RECOMMENDATIONS  Pt on hold until FU with MD due to the lack of change in symptoms in the L heel with PT. Will continue PT if recommended by MD 2x/week for 4 weeks       If you have any questions/comments please contact us directly at [phone]. Thank you for allowing us to assist in the care of your patient. Therapist Signature: Rosario Thakkar DPT Date: 6/27/2019     Time: 6:45 PM   NOTE TO PHYSICIAN:  PLEASE COMPLETE THE ORDERS BELOW AND FAX TO   [facility] at [facility]: [phone]. If you are unable to process this request in 24 hours please contact our office: [phone].    ___ I have read the above report and request that my patient continue as recommended.   ___ I have read the above report and request that my patient continue therapy with the following changes/special instructions:_________________________________________________________   ___ I have read the above report and request that my patient be discharged from therapy.      Physician Signature:        Date:       Time:

## 2020-05-22 NOTE — PROGRESS NOTES
[address] [facility] AT [facility]  [address] [address],[address], [facility] [place], [place]  Phone [phone]  Fax  SUMMARY  Patient Name: Sawyer Velasquez : 1965   Treatment/Medical Diagnosis: Left foot pain [C41.275]   Referral Source: Von Squires MD     Date of Initial Visit: 5/15/2019 Attended Visits: 10 Missed Visits: 0     SUMMARY OF TREATMENT  Pt seen for L heel pain for 10 sessions. He was last seen on 2019 and was referred back to MD for further evaluation as pt continued to have pain despite having therapy. Pt did not return to PT following MD appt. Please refer to PN on 19 for final measurements and DC from PT at this time. RECOMMENDATIONS  Discontinue therapy due to lack of appreciable progress towards goals. If you have any questions/comments please contact us directly at [phone]. Thank you for allowing us to assist in the care of your patient.     Therapist Signature: Ruba Muñiz DPT Date: 2020     Time: 9:16 AM